# Patient Record
Sex: MALE | Race: WHITE | NOT HISPANIC OR LATINO | URBAN - METROPOLITAN AREA
[De-identification: names, ages, dates, MRNs, and addresses within clinical notes are randomized per-mention and may not be internally consistent; named-entity substitution may affect disease eponyms.]

---

## 2022-04-25 ENCOUNTER — OFFICE VISIT (OUTPATIENT)
Dept: URBAN - METROPOLITAN AREA CLINIC 63 | Facility: CLINIC | Age: 72
End: 2022-04-25

## 2022-04-28 ENCOUNTER — OFFICE VISIT (OUTPATIENT)
Dept: URBAN - METROPOLITAN AREA SURGERY CENTER 4 | Facility: SURGERY CENTER | Age: 72
End: 2022-04-28

## 2022-04-29 LAB
A/G RATIO: 1.7
ALBUMIN: (no result)
ALKALINE PHOSPHATASE: (no result)
ALT (SGPT): (no result)
AMYLASE: (no result)
AST (SGOT): (no result)
BASO (ABSOLUTE): (no result)
BASOS: (no result)
BILIRUBIN, TOTAL: (no result)
BUN/CREATININE RATIO: 13
BUN: (no result)
CALCIUM: (no result)
CARBON DIOXIDE, TOTAL: (no result)
CHLORIDE: (no result)
CREATININE: (no result)
DEAMIDATED GLIADIN ABS, IGA: (no result)
DEAMIDATED GLIADIN ABS, IGG: (no result)
EGFR: (no result)
ENDOMYSIAL ANTIBODY IGA: NEGATIVE
EOS (ABSOLUTE): (no result)
EOS: (no result)
GLOBULIN, TOTAL: (no result)
GLUCOSE: (no result)
HEMATOCRIT: (no result)
HEMATOLOGY COMMENTS:: (no result)
HEMOGLOBIN: (no result)
IMMATURE CELLS: (no result)
IMMATURE GRANS (ABS): (no result)
IMMATURE GRANULOCYTES: (no result)
IMMUNOGLOBULIN A, QN, SERUM: (no result)
LIPASE: (no result)
LYMPHS (ABSOLUTE): (no result)
LYMPHS: (no result)
MCH: (no result)
MCHC: (no result)
MCV: (no result)
MONOCYTES(ABSOLUTE): (no result)
MONOCYTES: (no result)
NEUTROPHILS (ABSOLUTE): (no result)
NEUTROPHILS: (no result)
NRBC: (no result)
PLATELETS: (no result)
POTASSIUM: (no result)
PROTEIN, TOTAL: (no result)
RBC: (no result)
RDW: (no result)
SODIUM: (no result)
T-TRANSGLUTAMINASE (TTG) IGA: (no result)
T-TRANSGLUTAMINASE (TTG) IGG: (no result)
TSH: (no result)
WBC: (no result)

## 2022-05-02 LAB — PATHOLOGY (INDENTED REPORT): (no result)

## 2022-07-09 ENCOUNTER — TELEPHONE ENCOUNTER (OUTPATIENT)
Dept: URBAN - METROPOLITAN AREA CLINIC 121 | Facility: CLINIC | Age: 72
End: 2022-07-09

## 2022-07-09 RX ORDER — OMEPRAZOLE 20 MG/1
ONCE A DAY CAPSULE, DELAYED RELEASE ORAL ONCE A DAY
Refills: 2 | OUTPATIENT
Start: 2022-04-25 | End: 2022-04-28

## 2022-07-09 RX ORDER — RIFAXIMIN 550 MG/1
TAKE ONE TABLET PO THREE TIMES A DAY FOR 10 DAYS TABLET ORAL THREE TIMES A DAY
Refills: 0 | OUTPATIENT
Start: 2022-05-17 | End: 2022-05-20

## 2022-07-10 ENCOUNTER — TELEPHONE ENCOUNTER (OUTPATIENT)
Dept: URBAN - METROPOLITAN AREA CLINIC 121 | Facility: CLINIC | Age: 72
End: 2022-07-10

## 2022-07-10 RX ORDER — UBIDECARENONE 50 MG
CAPSULE ORAL TWICE A DAY
Refills: 0 | Status: ACTIVE | COMMUNITY
Start: 2022-04-25

## 2022-07-10 RX ORDER — RIFAXIMIN 550 MG/1
TAKE ONE TABLET PO THREE TIMES A DAY FOR 10 DAYS TABLET ORAL THREE TIMES A DAY
Refills: 0 | Status: ACTIVE | COMMUNITY
Start: 2022-05-20

## 2022-07-10 RX ORDER — SIMETHICONE 180 MG/1
CAPSULE, LIQUID FILLED ORAL ONCE A DAY
Refills: 0 | Status: ACTIVE | COMMUNITY
Start: 2022-04-25

## 2022-07-10 RX ORDER — OMEPRAZOLE 20 MG/1
ONCE A DAY CAPSULE, DELAYED RELEASE ORAL ONCE A DAY
Refills: 8 | Status: ACTIVE | COMMUNITY
Start: 2022-05-11

## 2022-07-10 RX ORDER — FAMOTIDINE 10 MG/1
TABLET, FILM COATED ORAL ONCE A DAY
Refills: 0 | Status: ACTIVE | COMMUNITY
Start: 2022-04-25

## 2022-08-26 ENCOUNTER — TELEPHONE ENCOUNTER (OUTPATIENT)
Dept: URBAN - METROPOLITAN AREA CLINIC 63 | Facility: CLINIC | Age: 72
End: 2022-08-26

## 2022-08-26 RX ORDER — RIFAXIMIN 550 MG/1
TAKE ONE TABLET PO THREE TIMES A DAY FOR 10 DAYS TABLET ORAL THREE TIMES A DAY
Qty: 42 | Refills: 0

## 2022-11-02 ENCOUNTER — TELEPHONE ENCOUNTER (OUTPATIENT)
Dept: URBAN - METROPOLITAN AREA CLINIC 63 | Facility: CLINIC | Age: 72
End: 2022-11-02

## 2022-11-03 ENCOUNTER — WEB ENCOUNTER (OUTPATIENT)
Dept: URBAN - METROPOLITAN AREA CLINIC 63 | Facility: CLINIC | Age: 72
End: 2022-11-03

## 2022-11-03 ENCOUNTER — OFFICE VISIT (OUTPATIENT)
Dept: URBAN - METROPOLITAN AREA CLINIC 63 | Facility: CLINIC | Age: 72
End: 2022-11-03
Payer: MEDICARE

## 2022-11-03 ENCOUNTER — LAB OUTSIDE AN ENCOUNTER (OUTPATIENT)
Dept: URBAN - METROPOLITAN AREA CLINIC 63 | Facility: CLINIC | Age: 72
End: 2022-11-03

## 2022-11-03 VITALS
OXYGEN SATURATION: 100 % | HEIGHT: 68 IN | TEMPERATURE: 97.4 F | SYSTOLIC BLOOD PRESSURE: 138 MMHG | DIASTOLIC BLOOD PRESSURE: 82 MMHG | HEART RATE: 68 BPM | WEIGHT: 175 LBS | BODY MASS INDEX: 26.52 KG/M2

## 2022-11-03 DIAGNOSIS — R10.9 LEFT FLANK PAIN: ICD-10-CM

## 2022-11-03 PROCEDURE — 99213 OFFICE O/P EST LOW 20 MIN: CPT | Performed by: PHYSICIAN ASSISTANT

## 2022-11-03 RX ORDER — SIMETHICONE 180 MG/1
1 CAPSULE AFTER MEALS AND AT BEDTIME AS NEEDED CAPSULE, LIQUID FILLED ORAL
Refills: 0 | Status: ACTIVE | COMMUNITY
Start: 2022-04-25

## 2022-11-03 RX ORDER — RIFAXIMIN 550 MG/1
TAKE ONE TABLET PO THREE TIMES A DAY FOR 10 DAYS TABLET ORAL THREE TIMES A DAY
Qty: 42 | Refills: 0 | Status: ACTIVE | COMMUNITY

## 2022-11-03 RX ORDER — UBIDECARENONE 50 MG
CAPSULE ORAL TWICE A DAY
Refills: 0 | Status: ACTIVE | COMMUNITY
Start: 2022-04-25

## 2022-11-03 NOTE — HPI-TODAY'S VISIT:
72-year-old male with no major medical problems presents to the office for evaluation of abdominal pain.  He was last seen in the office in April 2022 reporting 2 years of mid abdominal discomfort occurring intermittently 2 to 3 days out of the week.  He denied any nausea or vomiting.  He reported intermittent postprandial abdominal bloating and excessive belching.  He utilizes Gas-X on most days.  He was started on omeprazole 20 mg daily.  He had read about SIBO and believed that that may be causing all of his symptoms.   EGD was done on April 28, 2022 and was grossly normal.  Gastric biopsy was negative for H. pylori.  CT abdomen/pelvis was done on April 27 which demonstrated no acute abnormality. A 7mm nonobsgtructing stone was noted in the left kidney.  Labs including CBC, CMP, amylase, lipase, TSH were normal.  Celiac panel was negative. His last colonoscopy was done in 2019.  He reported a personal history of colon polyps.  He follows up today reporting left flank pain every afternoon over the past week. He takes a motrin and then his pain resolves. He has no hematuria or unusual odor to his urine. Denies any fever/chills.  Still reports excessive belching. His GI symptoms improved significantly after taking xifaxan. He called in to the office in May with return of his symptoms and he was prescribed another course of xifaxan.  He has a family history of pancreatic cancer in his mother at age 98.

## 2022-11-10 ENCOUNTER — TELEPHONE ENCOUNTER (OUTPATIENT)
Dept: URBAN - METROPOLITAN AREA CLINIC 63 | Facility: CLINIC | Age: 72
End: 2022-11-10

## 2022-11-21 ENCOUNTER — WEB ENCOUNTER (OUTPATIENT)
Dept: URBAN - METROPOLITAN AREA CLINIC 63 | Facility: CLINIC | Age: 72
End: 2022-11-21

## 2022-11-21 ENCOUNTER — OFFICE VISIT (OUTPATIENT)
Dept: URBAN - METROPOLITAN AREA CLINIC 63 | Facility: CLINIC | Age: 72
End: 2022-11-21
Payer: MEDICARE

## 2022-11-21 ENCOUNTER — DASHBOARD ENCOUNTERS (OUTPATIENT)
Age: 72
End: 2022-11-21

## 2022-11-21 VITALS
HEART RATE: 69 BPM | TEMPERATURE: 96.6 F | OXYGEN SATURATION: 99 % | BODY MASS INDEX: 25.16 KG/M2 | WEIGHT: 166 LBS | SYSTOLIC BLOOD PRESSURE: 120 MMHG | HEIGHT: 68 IN | DIASTOLIC BLOOD PRESSURE: 80 MMHG

## 2022-11-21 DIAGNOSIS — N20.0 NEPHROLITHIASIS: ICD-10-CM

## 2022-11-21 DIAGNOSIS — R10.9 ABDOMINAL PAIN, UNSPECIFIED ABDOMINAL LOCATION: ICD-10-CM

## 2022-11-21 DIAGNOSIS — R14.1 GAS PAIN: ICD-10-CM

## 2022-11-21 DIAGNOSIS — R14.2 ERUCTATION: ICD-10-CM

## 2022-11-21 DIAGNOSIS — R14.3 FLATULENCE: ICD-10-CM

## 2022-11-21 PROBLEM — 95570007: Status: ACTIVE | Noted: 2022-11-21

## 2022-11-21 PROCEDURE — 99213 OFFICE O/P EST LOW 20 MIN: CPT | Performed by: PHYSICIAN ASSISTANT

## 2022-11-21 RX ORDER — SIMETHICONE 180 MG/1
1 CAPSULE AFTER MEALS AND AT BEDTIME AS NEEDED CAPSULE, LIQUID FILLED ORAL
Refills: 0 | Status: ACTIVE | COMMUNITY
Start: 2022-04-25

## 2022-11-21 RX ORDER — OXYCODONE HYDROCHLORIDE AND ACETAMINOPHEN 5; 325 MG/1; MG/1
1 TABLET AS NEEDED TABLET ORAL
Status: ACTIVE | COMMUNITY

## 2022-11-21 RX ORDER — UBIDECARENONE 50 MG
CAPSULE ORAL TWICE A DAY
Refills: 0 | Status: ACTIVE | COMMUNITY
Start: 2022-04-25

## 2022-11-21 NOTE — HPI-TODAY'S VISIT:
72-year-old male with no major medical problems presented to the office 3 weeks ago for evaluation of abdominal discomfort.  He reported left flank pain occurring every afternoon over 1 week.  He reported taking Motrin when his pain occurred with resolution.  He denied hematuria or any unusual odor to his urine.  He denied fever/chills He was sent for a CT scan of the abdomen and pelvis which was done on November 9, 2022.  His CT demonstrated an 8 mm obstructing calculus proximal to the left ureter resulting in moderate left hydronephrosis and hydroureter.  He was having significant pain following his CT and was advised to go to the ER on November 16.  CT was repeated which demonstrated a 6 mm stone in the left proximal to mid ureter with moderate hydroureteronephrosis with perinephric and periureteral stranding.  He followed up with urology and underwent ablation of the stone and stent placement on November 18.  He follows up doing well. Still having mild left flank discomfort but this has improved. He currently has no complaints of gas, belching, flatus which have been problematic for him in the past. He had constipation with hydrocodone and is using miralax.  Now having regular BMs.    He was previously seen in the office in April 2022 reporting 2 years of mid abdominal discomfort occurring intermittently 2 to 3 days out of the week.  He reported intermittent postprandial abdominal bloating and excessive belching.  He utilizes Gas-X.  He was started on omeprazole.  He was treated with 2 courses of Xifaxan which resulted in significant symptom improvement. EGD was done on April 28, 2022 and was grossly normal.  His gastric biopsy was negative for H. pylori. Colonoscopy was done in 2019.  He reports a personal history of colon polyps. No family history of colon cancer.  His mother had pancreatic cancer at age 98.